# Patient Record
Sex: FEMALE | Race: WHITE | Employment: OTHER | URBAN - METROPOLITAN AREA
[De-identification: names, ages, dates, MRNs, and addresses within clinical notes are randomized per-mention and may not be internally consistent; named-entity substitution may affect disease eponyms.]

---

## 2017-07-11 ENCOUNTER — THERAPY VISIT (OUTPATIENT)
Dept: PHYSICAL THERAPY | Facility: CLINIC | Age: 77
End: 2017-07-11
Payer: COMMERCIAL

## 2017-07-11 DIAGNOSIS — R10.32 LEFT GROIN PAIN: Primary | ICD-10-CM

## 2017-07-11 PROCEDURE — G8982 BODY POS GOAL STATUS: HCPCS | Mod: GP | Performed by: PHYSICAL THERAPIST

## 2017-07-11 PROCEDURE — 97112 NEUROMUSCULAR REEDUCATION: CPT | Mod: GP | Performed by: PHYSICAL THERAPIST

## 2017-07-11 PROCEDURE — G8981 BODY POS CURRENT STATUS: HCPCS | Mod: GP | Performed by: PHYSICAL THERAPIST

## 2017-07-11 PROCEDURE — 97161 PT EVAL LOW COMPLEX 20 MIN: CPT | Mod: GP | Performed by: PHYSICAL THERAPIST

## 2017-07-11 NOTE — MR AVS SNAPSHOT
"              After Visit Summary   7/11/2017    Mirna Sweet    MRN: [id number]           Patient Information     Date Of Birth          1940        Visit Information        Provider Department      7/11/2017 9:30 AM Blayne Osuna, PT [facility] Physical Therapy        Today's Diagnoses     Left groin pain    -  1       Follow-ups after your visit        Your next 10 appointments already scheduled     Jul 17, 2017 10:50 AM CDT   JESÚS Extremity with Blayne Osuna PT   [facility] Physical Therapy ([facility]  )    [address]  [facility] [address]   [phone]            Jul 24, 2017 10:50 AM CDT   JESÚS Extremity with Blayne Osuna PT   [facility] ([facility]  )    [address]  [facility] [id number] [phone]              Who to contact     If you have questions or need follow up information about today's clinic visit or your schedule please contact [facility] [place] PHYSICAL THERAPY directly at [phone].  Normal or non-critical lab and imaging results will be communicated to you by MyChart, letter or phone within 4 business days after the clinic has received the results. If you do not hear from us within 7 days, please contact the clinic through Yurbudshart or phone. If you have a critical or abnormal lab result, we will notify you by phone as soon as possible.  Submit refill requests through aDealio or call your pharmacy and they will forward the refill request to us. Please allow 3 business days for your refill to be completed.          Additional Information About Your Visit        Yurbudshart Information     [email or web address] lets you send messages to your doctor, view your test results, renew your prescriptions, schedule appointments and more. To sign up, go to [email or web address] . Click on \"Log in\" on the left side of the " "screen, which will take you to the Welcome page. Then click on \"Sign up Now\" on the right side of the page.     You will be asked to enter the access code listed below, as well as some personal information. Please follow the directions to create your username and password.     Your access code is: [email or web address]  Expires: 10/10/2017  7:16 AM     Your access code will  in 90 days. If you need help or a new code, please call your [place] clinic or [phone].        Care EveryWhere ID     This is your Care EveryWhere ID. This could be used by other organizations to access your [place] medical records  [id number]         Blood Pressure from Last 3 Encounters:   10/09/12 138/64    Weight from Last 3 Encounters:   No data found for Wt              We Performed the Following     HC PT EVAL, LOW COMPLEXITY     JESÚS CERT REPORT     JESÚS INITIAL EVAL REPORT     NEUROMUSCULAR RE-EDUCATION        Primary Care Provider    None Specified       No primary provider on file.        Equal Access to Services     : Hadii clifford kohlero Sonate, waaxda luqadaha, qaybta kaalmada adeterellyachico, mame brice . So [facility] [phone].    ATENCIÓN: Si habla español, tiene a duggan disposición servicios gratuitos de asistencia lingüística. Llame al [phone].    We comply with applicable federal civil rights laws and [place] laws. We do not discriminate on the basis of race, color, national origin, age, disability sex, sexual orientation or gender identity.            Thank you!     Thank you for choosing [facility] PH[facility]ICAL THERAP[facility]  for your care. Our goal is always to provide you with excellent care. Hearing back from our patients is one way we can continue to improve our services. Please take a few minutes to complete the written survey that you may receive in the mail after your visit with us. Thank you!             Your Updated Medication List - Protect others " around you: Learn how to safely use, store and throw away your medicines at www.disposemymeds.org.          This list is accurate as of: 7/11/17 11:59 PM.  Always use your most recent med list.                   Brand Name Dispense Instructions for use Diagnosis    ALEVE PO      Take 220 mg by mouth 2 times daily (with meals).        calcium citrate-vitamin D 315-200 MG-UNIT Tabs per tablet    CITRACAL     Take 1 tablet by mouth daily.        CITRUCEL FIBERSHAKE PO      Take  by mouth.        HYDROCHLOROTHIAZIDE PO      Take 25 mg by mouth daily.        LISINOPRIL PO      Take 40 mg by mouth daily.        loratadine 10 MG tablet    CLARITIN     Take 10 mg by mouth daily.        moxifloxacin 0.5 % ophthalmic solution    VIGAMOX    3 mL    Apply 1 drop to eye 4 times daily. Instill into operative eye(s) per physician instructions    Macular hole       OMEPRAZOLE PO      Take 20 mg by mouth every morning.        OXYBUTYNIN CHLORIDE PO      Take 2.5 mg by mouth daily.        prednisoLONE acetate 1 % ophthalmic susp    PRED FORTE    5 mL    Apply 1 drop to eye 4 times daily. Instill into operative eye(s) per physician instructions.    Macular hole

## 2017-07-11 NOTE — PROGRESS NOTES
[place] Initial Evaluation      Subjective:    Patient is a 77 year old female presenting with rehab left hip hpi.   Mirna Sweet is a 77 year old female with a left hip condition.  Occurance: In the past has had pain in left groin and was told it was a pulled muscle and went away. Now getting groin pain again after stepping down into a boat when in [place] in June.  Condition occurred: in the community.  This is a new condition  June of 2017, 626/17 Date of MD order.    Patient reports pain:  Groin.  Radiates to:  Thigh.  Pain is described as aching (pulling pain) and is intermittent and reported as 3/10.  Associated symptoms:  Tingling, numbness and loss of motion/stiffness (does have numbness in tingling thoughout lateral foot but feels its unrealated to current injury because of infection in the past. ). Pain is the same all the time.  Exacerbated by: lifting leg up to get into daughters car, walking occasionally, stairs, When lying on her side in bed without a pillow. Relieved by: using a pillow in bed between legs, lying on her stomach,   Since onset symptoms are gradually improving.  Special tests:  X-ray (patient unable to recall the results).      General health as reported by patient is good.  Pertinent medical history includes:  Osteoarthritis, overweight and high blood pressure.  Medical allergies: no.  Other surgeries include:  Other (bladder tumor, hysterectomy. ).  Current medications:  Heparin/coumadin and high blood pressure medication (omperazol).  Current occupation is retired.    Employment tasks: house work.    Barriers include:  None as reported by the patient.    Red flags:  None as reported by the patient.                        Objective:    Standing Alignment:        Lumbar:  Lordosis incr            Gait:    Gait Type:  Normal   Assistive Devices:  None                 Lumbar/SI Evaluation  ROM:    AROM Lumbar:   Flexion:          Handst to ankles no pain.    Ext:                    Moderate limitation no pain.    Side Bend:        Left:  WNL no pain    Right:  WNL with pull throughout left lateral hip.   Rotation:           Left:     Right:   Side Glide:        Left:     Right:                                                               Hip Evaluation  HIP AROM:    Flexion: Left: first rep 100 deg extermely painful, after 106 deg and no pain.    Right:                   Hip PROM:  : no pain.  Flexion: Left: 106  Right:    Abduction: Left: 45   Right:    Internal Rotation: Left: 44   Right:  External Rotation: Left: 45   Right:              Hip Strength:    Flexion:   Left: 4/5    Pain: weak/painful  Right: 5/5    Pain: strong/pain free                    Extension:  Left: 5/5   Pain:strong/pain freeRight: 5/5     Pain: strong/pain free    Abduction:  Left: 4+/5      Pain:weak/pain freeRight: 4+/5     Pain:weak/pain free  Adduction:  Left: 5/5     Pain:weak/pain freeRight: 5/5    Pain:strong/pain free  Internal Rotation:  Left: 5/5     Pain:strong/pain freeRight: 5/5    Pain:strong/pain free  External Rotation:  Left: 5/5    Pain: strong/pain free  Right: 5/5    Pain: strong/pain free  Knee Flexion:  Left: 5/5    Pain:strong/pain freeRight: 5/5    Pain: strong/pain free  Knee Extension:  Left: 5/5    Pain:strong/pain freeRight: 5/5     Pain: strong/pain free        Hip Special Testing:    Left hip positive for the following special tests:  Fadir/Labrum  Left hip negative for the following special tests:  Esther; SLR or Evaristo  Right hip negative for the following special tests:  Esther; Fadir/Labrum; SLR or Evaristo    Hip Palpation:    Left hip tenderness present at:   hip flexors  Left hip tenderness not present at:  Ischial Tuberosity; Greater Trachanter; IT Band; Adductors or Abductors    Right hip tenderness not present at:  Ischial Tuberosity; Greater Trachanter; IT Band or hip flexors             General     ROS    Assessment/Plan:      Patient is a 77 year old  female with left side hip complaints.    Patient has the following significant findings with corresponding treatment plan.                Diagnosis 1:  Left groin pain / anterior hip impingement  Pain -  hot/cold therapy, manual therapy, self management, education, directional preference exercise and home program  Decreased ROM/flexibility - manual therapy, therapeutic exercise, therapeutic activity and home program  Decreased strength - therapeutic exercise, therapeutic activities and home program  Decreased function - therapeutic activities and home program  Impaired posture - neuro re-education, therapeutic activities and home program    Therapy Evaluation Codes:   1) History comprised of:   Personal factors that impact the plan of care:      Age.    Comorbidity factors that impact the plan of care are:      Osteoarthritis.     Medications impacting care: None.  2) Examination of Body Systems comprised of:   Body structures and functions that impact the plan of care:      Hip.   Activity limitations that impact the plan of care are:      Sitting, Standing, Walking and Sleeping.  3) Clinical presentation characteristics are:   Stable/Uncomplicated.  4) Decision-Making    Low complexity using standardized patient assessment instrument and/or measureable assessment of functional outcome.  Cumulative Therapy Evaluation is: Low complexity.    Previous and current functional limitations:  (See Goal Flow Sheet for this information)    Short term and Long term goals: (See Goal Flow Sheet for this information)     Communication ability:  Patient appears to be able to clearly communicate and understand verbal and written communication and follow directions correctly.  Treatment Explanation - The following has been discussed with the patient:   RX ordered/plan of care  Anticipated outcomes  Possible risks and side effects  This patient would benefit from PT intervention to resume normal activities.   Rehab potential is  good.    Frequency:  1 X week, once daily  Duration:  for 6 weeks  Discharge Plan:  Achieve all LTG.  Independent in home treatment program.  Reach maximal therapeutic benefit.    Please refer to the daily flowsheet for treatment today, total treatment time and time spent performing 1:1 timed codes.

## 2017-07-11 NOTE — LETTER
[facility] ATHLETIC [facility] PHYSICAL [facility]  [address]  [facility] [address] [phone]    2017    Re: Mirna Sweet   :   1940  MRN:  [id number]   REFERRING PHYSICIAN:   Cong Vizcaino    [facility]  Date of Initial Evaluation:  17  Visits:  Rxs Used: 1  Reason for Referral:  Left groin pain    EVALUATION SUMMARY    [facility] Initial Evaluation      Subjective:    Patient is a 77 year old female presenting with rehab left hip hpi.   Mirna Sweet is a 77 year old female with a left hip condition.  Occurance: In the past has had pain in left groin and was told it was a pulled muscle and went away. Now getting groin pain again after stepping down into a boat when in [place] in .  Condition occurred: in the community.  This is a new condition  2017,  Date of MD order.    Patient reports pain:  Groin.  Radiates to:  Thigh.  Pain is described as aching (pulling pain) and is intermittent and reported as 3/10.  Associated symptoms:  Tingling, numbness and loss of motion/stiffness (does have numbness in tingling thoughout lateral foot but feels its unrealated to current injury because of infection in the past. ). Pain is the same all the time.  Exacerbated by: lifting leg up to get into daughters car, walking occasionally, stairs, When lying on her side in bed without a pillow. Relieved by: using a pillow in bed between legs, lying on her stomach,   Since onset symptoms are gradually improving.  Special tests:  X-ray (patient unable to recall the results).      General health as reported by patient is good.  Pertinent medical history includes:  Osteoarthritis, overweight and high blood pressure.  Medical allergies: no.  Other surgeries include:  Other (bladder tumor, hysterectomy. ).  Current medications:  Heparin/coumadin and high blood pressure medication (omperazol).   Current occupation is retired.    Employment tasks: house work.    Barriers include:  None as reported by the patient.  Red flags:  None as reported by the patient.    Objective:    Standing Alignment:    Lumbar:  Lordosis incr    Gait:    Gait Type:  Normal   Assistive Devices:  None  Lumbar/SI Evaluation  ROM:    AROM Lumbar:   Flexion:          Handst to ankles no pain.   Ext:                    Moderate limitation no pain.    Side Bend:        Left:  WNL no pain    Right:  WNL with pull throughout left lateral hip.   Rotation:           Left:     Right:   Side Glide:        Left:     Right:         Hip Evaluation  HIP AROM:    Flexion: Left: first rep 100 deg extermely painful, after 106 deg and no pain.    Right:     Hip PROM:  : no pain.  Flexion: Left: 106  Right:  Abduction: Left: 45   Right:  Internal Rotation: Left: 44   Right:  External Rotation: Left: 45   Right:      Hip Strength:    Flexion:   Left: 4/5    Pain: weak/painful  Right: 5/5    Pain: strong/pain free               Extension:  Left: 5/5   Pain:strong/pain freeRight: 5/5     Pain: strong/pain free    Abduction:  Left: 4+/5      Pain:weak/pain freeRight: 4+/5     Pain:weak/pain free  Adduction:  Left: 5/5     Pain:weak/pain freeRight: 5/5    Pain:strong/pain free  Internal Rotation:  Left: 5/5     Pain:strong/pain freeRight: 5/5    Pain:strong/pain free  External Rotation:  Left: 5/5    Pain: strong/pain free  Right: 5/5    Pain: strong/pain free  Knee Flexion:  Left: 5/5    Pain:strong/pain freeRight: 5/5    Pain: strong/pain free  Knee Extension:  Left: 5/5    Pain:strong/pain freeRight: 5/5     Pain: strong/pain free      Hip Special Testing:    Left hip positive for the following special tests:  Fadir/Labrum  Left hip negative for the following special tests:  Esther; SLR or Evaristo  Right hip negative for the following special tests:  Esther; Fadir/Labrum; SLR or Evaristo      Hip Palpation:    Left hip tenderness present at:   hip  flexors  Left hip tenderness not present at:  Ischial Tuberosity; Greater Trachanter; IT Band; Adductors or Abductors  Right hip tenderness not present at:  Ischial Tuberosity; Greater Trachanter; IT Band or hip flexors    Assessment/Plan:      Patient is a 77 year old female with left side hip complaints.    Patient has the following significant findings with corresponding treatment plan.                Diagnosis 1:  Left groin pain / anterior hip impingement  Pain -  hot/cold therapy, manual therapy, self management, education, directional preference exercise and home program  Decreased ROM/flexibility - manual therapy, therapeutic exercise, therapeutic activity and home program  Decreased strength - therapeutic exercise, therapeutic activities and home program  Decreased function - therapeutic activities and home program  Impaired posture - neuro re-education, therapeutic activities and home program    Therapy Evaluation Codes:   1) History comprised of:   Personal factors that impact the plan of care:      Age.    Comorbidity factors that impact the plan of care are:      Osteoarthritis.     Medications impacting care: None.  2) Examination of Body Systems comprised of:   Body structures and functions that impact the plan of care:      Hip.   Activity limitations that impact the plan of care are:      Sitting, Standing, Walking and Sleeping.  3) Clinical presentation characteristics are:   Stable/Uncomplicated.  4) Decision-Making    Low complexity using standardized patient assessment instrument and/or measureable assessment of functional outcome.  Cumulative Therapy Evaluation is: Low complexity.    Previous and current functional limitations:  (See Goal Flow Sheet for this information)    Short term and Long term goals: (See Goal Flow Sheet for this information)     Communication ability:  Patient appears to be able to clearly communicate and understand verbal and written communication and follow directions  correctly.  Treatment Explanation - The following has been discussed with the patient:   RX ordered/plan of care  Anticipated outcomes  Possible risks and side effects  This patient would benefit from PT intervention to resume normal activities.   Rehab potential is good.    Frequency:  1 X week, once daily  Duration:  for 6 weeks  Discharge Plan:  Achieve all LTG.  Independent in home treatment program.  Reach maximal therapeutic benefit.      Thank you for your referral.    INQUIRIES  Therapist: Blayne Osuna DPT  [facility]  [address] [place], [place] [address] [id number]  [facility] [address]  Phone: [phone]  Fax: [phone]

## 2017-07-17 ENCOUNTER — THERAPY VISIT (OUTPATIENT)
Dept: PHYSICAL THERAPY | Facility: CLINIC | Age: 77
End: 2017-07-17
Payer: COMMERCIAL

## 2017-07-17 DIAGNOSIS — R10.32 LEFT GROIN PAIN: ICD-10-CM

## 2017-07-17 PROCEDURE — 97110 THERAPEUTIC EXERCISES: CPT | Mod: GP | Performed by: PHYSICAL THERAPIST

## 2017-07-17 NOTE — MR AVS SNAPSHOT
"              After Visit Summary   7/17/2017    Mirna Sweet    MRN: [id number]           Patient Information     Date Of Birth          1940        Visit Information        Provider Department      7/17/2017 10:50 AM Blayne Osuna PT [facility] Physical Therapy        Today's Diagnoses     Left groin pain           Follow-ups after your visit        Your next 10 appointments already scheduled     Jul 24, 2017 10:50 AM CDT   JESÚS Extremity with Blayne Osuna PT   [facility] Physical Therapy ([facility]  )    [address]  [facility] [id number] [phone]              Who to contact     If you have questions or need follow up information about today's clinic visit or your schedule please contact [facility] PHYSICAL THERAPY directly at [phone].  Normal or non-critical lab and imaging results will be communicated to you by Commun[employer] for [employer], letter or phone within 4 business days after the clinic has received the results. If you do not hear from us within 7 days, please contact the clinic through [employer] or phone. If you have a critical or abnormal lab result, we will notify you by phone as soon as possible.  Submit refill requests through [email or web address] or call your pharmacy and they will forward the refill request to us. Please allow 3 business days for your refill to be completed.          Additional Information About Your Visit        MyChart Information     [email or web address] lets you send messages to your doctor, view your test results, renew your prescriptions, schedule appointments and more. To sign up, go to [email or web address] . Click on \"Log in\" on the left side of the screen, which will take you to the Welcome page. Then click on \"Sign up Now\" on the right side of the page.     You will be asked to enter the access code listed below, as well as some personal information. Please follow the " directions to create your username and password.     Your access code is: [email or web address]  Expires: 10/10/2017  7:16 AM     Your access code will  in 90 days. If you need help or a new code, please call your [place] clinic or [phone].        Care EveryWhere ID     This is your Care EveryWhere ID. This could be used by other organizations to access your [place] medical records  [id number]         Blood Pressure from Last 3 Encounters:   10/09/12 138/64    Weight from Last 3 Encounters:   No data found for Wt              We Performed the Following     THERAPEUTIC EXERCISES        Primary Care Provider    None Specified       No primary provider on file.        Equal Access to Services     [facility]: Hadii clifford Durant, kylah salgado, juan amatoalmachico frazier, mame brice . So [facility] [phone].    ATENCIÓN: Si habla español, tiene a duggan disposición servicios gratuitos de asistencia lingüística. Llame al [phone].    We comply with applicable federal civil rights laws and [place] laws. We do not discriminate on the basis of race, color, national origin, age, disability sex, sexual orientation or gender identity.            Thank you!     Thank you for choosing [facility] PHYSICAL THERAPY  for your care. Our goal is always to provide you with excellent care. Hearing back from our patients is one way we can continue to improve our services. Please take a few minutes to complete the written survey that you may receive in the mail after your visit with us. Thank you!             Your Updated Medication List - Protect others around you: Learn how to safely use, store and throw away your medicines at www.disposemymeds.org.          This list is accurate as of: 17 11:34 AM.  Always use your most recent med list.                   Brand Name Dispense Instructions for use Diagnosis    ALEVE PO      Take 220 mg by mouth 2 times  daily (with meals).        calcium citrate-vitamin D 315-200 MG-UNIT Tabs per tablet    CITRACAL     Take 1 tablet by mouth daily.        CITRUCEL FIBERSHAKE PO      Take  by mouth.        HYDROCHLOROTHIAZIDE PO      Take 25 mg by mouth daily.        LISINOPRIL PO      Take 40 mg by mouth daily.        loratadine 10 MG tablet    CLARITIN     Take 10 mg by mouth daily.        moxifloxacin 0.5 % ophthalmic solution    VIGAMOX    3 mL    Apply 1 drop to eye 4 times daily. Instill into operative eye(s) per physician instructions    Macular hole       OMEPRAZOLE PO      Take 20 mg by mouth every morning.        OXYBUTYNIN CHLORIDE PO      Take 2.5 mg by mouth daily.        prednisoLONE acetate 1 % ophthalmic susp    PRED FORTE    5 mL    Apply 1 drop to eye 4 times daily. Instill into operative eye(s) per physician instructions.    Macular hole

## 2017-07-17 NOTE — LETTER
[facility] ATHLETIC [facility] PHYSICAL [facility]  [address]  [facility] [address] [phone]    2017    Re: Mirna Sweet   :   1940  MRN:  [id number]   REFERRING PHYSICIAN:   Cong Vizcaino    [facility] ATHLETIC [facility]  Date of Initial Evaluation:    Visits:  Rxs Used: 2  Reason for Referral:  Left groin pain    DISCHARGE REPORT  Progress reporting period is from 17 to 17.       SUBJECTIVE  Patient reports the kneeling exercises seemed to be going okay but now her knees are starting to get sore so had to stop. Is able to roll over in bed a lot better. Bending forward is still bothersome.     Current Pain level: 2/10.     Initial Pain level: 3/10.   Changes in function:  Yes (See Goal flowsheet attached for changes in current functional level)  Adverse reaction to treatment or activity: None    OBJECTIVE  Changes noted in objective findings:  Patient has failed to return to therapy so current objective findings are unknown.  Objective: hip flexion 4/5 no pain, + mel test on left, Hip ROM WNL no pain, no pain with palpation of hip flexor.      ASSESSMENT/PLAN  Updated problem list and treatment plan: Diagnosis 1:  Left groin pain / anterior hip impingement  Pain -  home program  Decreased ROM/flexibility - home program  Decreased strength - home program  Decreased function - home program  Impaired posture - home program  STG/LTGs have been met or progress has been made towards goals:  Yes (See Goal flow sheet completed today.)  Assessment of Progress: Patient has not returned to therapy.  Current status is unknown and discharge G code cannot be reported.  Self Management Plans:  Patient is independent in a home treatment program.  Patient has not returned to therapy and has no future appointments scheduled. Patient current status unknown and patient is to be discharged at this time.         Recommendations:  Discharge Physical Therapy with home exercise program.      Thank you for your referral.    INQUIRIES  Therapist: Blayne Osuna DPT  [facility]  [address]  [facility] [address]  Phone: [phone]  Fax: [phone]

## 2017-10-10 PROBLEM — R10.32 LEFT GROIN PAIN: Status: RESOLVED | Noted: 2017-07-11 | Resolved: 2017-10-10

## 2017-10-10 NOTE — PROGRESS NOTES
Subjective:    HPI                    Objective:    System    Physical Exam    General     ROS    Assessment/Plan:      DISCHARGE REPORT    Progress reporting period is from 7/11/17 to 7/17/17.       SUBJECTIVE  Patient reports the kneeling exercises seemed to be going okay but now her knees are starting to get sore so had to stop. Is able to roll over in bed a lot better. Bending forward is still bothersome.     Current Pain level: 2/10.     Initial Pain level: 3/10.   Changes in function:  Yes (See Goal flowsheet attached for changes in current functional level)  Adverse reaction to treatment or activity: None    OBJECTIVE  Changes noted in objective findings:  Patient has failed to return to therapy so current objective findings are unknown.  Objective: hip flexion 4/5 no pain, + mel test on left, Hip ROM WNL no pain, no pain with palpation of hip flexor.      ASSESSMENT/PLAN  Updated problem list and treatment plan: Diagnosis 1:  Left groin pain / anterior hip impingement  Pain -  home program  Decreased ROM/flexibility - home program  Decreased strength - home program  Decreased function - home program  Impaired posture - home program  STG/LTGs have been met or progress has been made towards goals:  Yes (See Goal flow sheet completed today.)  Assessment of Progress: Patient has not returned to therapy.  Current status is unknown and discharge G code cannot be reported.  Self Management Plans:  Patient is independent in a home treatment program.  Patient has not returned to therapy and has no future appointments scheduled. Patient current status unknown and patient is to be discharged at this time.        Recommendations:  Discharge Physical Therapy with home exercise program.       Please refer to the daily flowsheet for treatment today, total treatment time and time spent performing 1:1 timed codes.

## 2021-02-08 ENCOUNTER — IMMUNIZATION (OUTPATIENT)
Dept: PEDIATRICS | Facility: CLINIC | Age: 81
End: 2021-02-08
Payer: COMMERCIAL

## 2021-02-08 PROCEDURE — 91300 PR COVID VAC PFIZER DIL RECON 30 MCG/0.3 ML IM: CPT

## 2021-02-08 PROCEDURE — 0001A PR COVID VAC PFIZER DIL RECON 30 MCG/0.3 ML IM: CPT

## 2021-02-27 ENCOUNTER — HEALTH MAINTENANCE LETTER (OUTPATIENT)
Age: 81
End: 2021-02-27

## 2021-03-01 ENCOUNTER — IMMUNIZATION (OUTPATIENT)
Dept: PEDIATRICS | Facility: CLINIC | Age: 81
End: 2021-03-01
Attending: INTERNAL MEDICINE
Payer: COMMERCIAL

## 2021-03-01 PROCEDURE — 0002A PR COVID VAC PFIZER DIL RECON 30 MCG/0.3 ML IM: CPT

## 2021-03-01 PROCEDURE — 91300 PR COVID VAC PFIZER DIL RECON 30 MCG/0.3 ML IM: CPT

## 2021-10-02 ENCOUNTER — HEALTH MAINTENANCE LETTER (OUTPATIENT)
Age: 81
End: 2021-10-02

## 2022-03-19 ENCOUNTER — HEALTH MAINTENANCE LETTER (OUTPATIENT)
Age: 82
End: 2022-03-19

## 2022-09-03 ENCOUNTER — HEALTH MAINTENANCE LETTER (OUTPATIENT)
Age: 82
End: 2022-09-03

## 2023-04-29 ENCOUNTER — HEALTH MAINTENANCE LETTER (OUTPATIENT)
Age: 83
End: 2023-04-29